# Patient Record
Sex: FEMALE | URBAN - METROPOLITAN AREA
[De-identification: names, ages, dates, MRNs, and addresses within clinical notes are randomized per-mention and may not be internally consistent; named-entity substitution may affect disease eponyms.]

---

## 2020-12-01 ENCOUNTER — TELEPHONE (OUTPATIENT)
Dept: PULMONOLOGY | Age: 60
End: 2020-12-01

## 2020-12-01 NOTE — TELEPHONE ENCOUNTER
Patient has a persistent bad cough that has lasted for a month. Patient states PCP has tried everything and patient has no relief. Images of chest CT and CXR have been pushed over from [facility]. Patient has been COVID neg x2. Please advise on urgency and date of appt.

## 2020-12-02 NOTE — TELEPHONE ENCOUNTER
I left a message for Merna Moon to call us back to schedule. I told her we would be scheduling her for 12/23 with Dr. Nell Griffin. We will need her PCP to obtain records as well.

## 2020-12-08 NOTE — TELEPHONE ENCOUNTER
Phone to patient who states she wants to hold off on scheduling for now since she is still working with her PCP.